# Patient Record
(demographics unavailable — no encounter records)

---

## 2019-11-07 NOTE — DIAGNOSTIC IMAGING REPORT
EXAMINATION: Ultrasound limited left breast.



INDICATION: Left breast lump.



FINDINGS: By history, the patient has an area of induration and

redness along the lateral aspect of the left breast in the 5

o'clock position. The diagnostic mammogram performed prior to

this study noted a vague area of increased density in this area

but failed to reveal any sign of a discrete mass. On this study,

there is a poorly defined 1.3 cm hyperechoic region immediately

beneath the skin surface in the area of the patient's palpable

abnormality. I do suspect that this finding is related to an

inflammatory/infectious process. There is no drainable abscess

visualized. Even so, I would recommend that a short-term (2-4

weeks) follow-up ultrasound exam be obtained for further study.



No other abnormality is identified.



IMPRESSION:

1. There is a poorly defined hyperechoic area just beneath the

skin surface in the area of the patient's palpable abnormality in

the 5 o'clock position of the left breast. This is most likely

due to an inflammatory/infectious process. Recommendations as

above.

2. These results were discussed CARRO[facility] Esparza.



ACR BI-RADS Category 3: Probably benign findings.

Result letter will be mailed to the patient.

Note: At least 10% of breast cancer is not imaged by mammography.



Dictated by: 



  Dictated on workstation # [id number]

## 2019-11-07 NOTE — DIAGNOSTIC IMAGING REPORT
EXAMINATION: Digital mammogram bilateral diagnostic.



INDICATION: Left breast pain.



COMPARISON: This study was compared to the prior exams of

10/09/2018 and 07/18/2017.



At this time, the patient does complain of pain in the left

breast.



The current study was also evaluated with a Computer Aided

Detection (CAD) system. 3-D tomosynthesis was also performed and

reviewed.



FINDINGS: Reportedly, the patient developed pain in the left

breast approximately two days ago. She noticed an area of

induration and redness along the 5 o'clock position of the left

breast. A marker was placed over the area of concern. There is a

vague region of increased density in this area. This does not

have the appearance of a mass, and this could be related to a

focal inflammatory/infectious process. I would recommend that

ultrasound be performed for further study.



The overall appearance of the breasts has not changed

significantly otherwise. The fibroglandular tissue in each breast

is heterogeneously dense. This does limit the sensitivity of this

exam. There is no primary or secondary sign of malignancy noted.



IMPRESSION:

1. Ultrasound would be recommended for further evaluation of the

poorly defined area of increased density in the region of the

patient's pain in the 5 o'clock position of the left breast.

2. There is no evidence for malignancy.



ACR BI-RADS Category 0: Incomplete. (Needs additional imaging

evaluation).

Result letter will be mailed to the patient.

Note: At least 10% of breast cancer is not imaged by mammography.



Dictated by: 



  Dictated on workstation # [id number]